# Patient Record
Sex: FEMALE | Race: WHITE | NOT HISPANIC OR LATINO
[De-identification: names, ages, dates, MRNs, and addresses within clinical notes are randomized per-mention and may not be internally consistent; named-entity substitution may affect disease eponyms.]

---

## 2019-03-29 ENCOUNTER — APPOINTMENT (OUTPATIENT)
Dept: PULMONOLOGY | Facility: CLINIC | Age: 64
End: 2019-03-29
Payer: COMMERCIAL

## 2019-03-29 VITALS
OXYGEN SATURATION: 98 % | WEIGHT: 121 LBS | DIASTOLIC BLOOD PRESSURE: 50 MMHG | HEART RATE: 84 BPM | HEIGHT: 59 IN | SYSTOLIC BLOOD PRESSURE: 92 MMHG | BODY MASS INDEX: 24.39 KG/M2

## 2019-03-29 DIAGNOSIS — Z86.19 PERSONAL HISTORY OF OTHER INFECTIOUS AND PARASITIC DISEASES: ICD-10-CM

## 2019-03-29 DIAGNOSIS — R93.89 ABNORMAL FINDINGS ON DIAGNOSTIC IMAGING OF OTHER SPECIFIED BODY STRUCTURES: ICD-10-CM

## 2019-03-29 DIAGNOSIS — J47.9 BRONCHIECTASIS, UNCOMPLICATED: ICD-10-CM

## 2019-03-29 PROCEDURE — 99204 OFFICE O/P NEW MOD 45 MIN: CPT | Mod: 25

## 2019-03-29 PROCEDURE — 94060 EVALUATION OF WHEEZING: CPT

## 2019-03-30 PROBLEM — R93.89 ABNORMAL CHEST X-RAY: Status: ACTIVE | Noted: 2019-03-30

## 2019-03-30 PROBLEM — Z86.19 HISTORY OF MYCOBACTERIUM AVIUM COMPLEX INFECTION: Status: ACTIVE | Noted: 2019-03-30

## 2019-03-30 RX ORDER — ALBUTEROL SULFATE 90 UG/1
108 (90 BASE) AEROSOL, METERED RESPIRATORY (INHALATION)
Refills: 0 | Status: ACTIVE | COMMUNITY

## 2019-03-30 RX ORDER — BUDESONIDE AND FORMOTEROL FUMARATE DIHYDRATE 160; 4.5 UG/1; UG/1
160-4.5 AEROSOL RESPIRATORY (INHALATION)
Refills: 0 | Status: ACTIVE | COMMUNITY

## 2019-03-30 NOTE — PROCEDURE
[FreeTextEntry1] : PFT 11/10/14: Mild obstructive airways disease was noted. Significant improvement noted after inhalation of bronchodilator.\par \par PFT 3/29/19: Spirometry did not demonstrate any obstructive airways disease. No significant change was noted after inhalation of bronchodilator.\par \par Chest CT performed on March 17, 2011 demonstrated increased interstitial markings which were most pronounced in the right mid lung field. Atelectasis in the right middle lobe was suspected.\par \par Chest CT performed on November 11, 2013 demonstrated mild patchy areas of mucous plugging which was most pronounced in the right middle lobe. Other areas of mild bronchiectasis and small areas of atelectasis was also noted.\par \par Chest CT performed on November 1, 2014 at [facility] showed areas of bronchiectasis which was most pronounced in the right middle lobe and in the lingula. Mild lymphadenopathy was present. No adenopathy was noted.\par \par Chest radiograph performed at urgent care in March 2019 demonstrated subsegmental atelectasis in the right middle lobe. No infiltrates or effusions.\par \par \par \par \par \par

## 2019-03-30 NOTE — HISTORY OF PRESENT ILLNESS
[FreeTextEntry1] : She was seen in urgent care several weeks ago for a cough. She was placed on antibiotics. The cough has improved but not completely resolved. Denies any chest pain or shortness of breath. No hemoptysis. No constitutional symptoms.

## 2019-03-30 NOTE — PHYSICAL EXAM
[Normal Appearance] : normal appearance [Neck Cervical Mass (___cm)] : no neck mass was observed [Heart Sounds] : normal S1 and S2 [Auscultation Breath Sounds / Voice Sounds] : lungs were clear to auscultation bilaterally [Bowel Sounds] : normal bowel sounds [Nail Clubbing] : no clubbing of the fingernails [Skin Turgor] : normal skin turgor [No Focal Deficits] : no focal deficits [Oriented To Time, Place, And Person] : oriented to person, place, and time [Impaired Insight] : insight and judgment were intact [Erythema] : no erythema of the pharynx

## 2019-03-30 NOTE — REVIEW OF SYSTEMS
[Cough] : cough [Fever] : no fever [Sputum] : not coughing up ~M sputum [Hemoptysis] : no hemoptysis [Dyspnea] : no dyspnea [Chest Tightness] : no chest tightness [Wheezing] : no wheezing [Edema] : ~T edema was not present [Nasal Discharge] : no nasal discharge [Heartburn] : no heartburn [Back Pain] : ~T no back pain [Rash] : no [unfilled] rash [Anemia] : no anemia [Depression] : no depression [Anxiety] : no anxiety [Diabetes] : no diabetes mellitus [Thyroid Problem] : no thyroid problem [DVT] : no DVT [Snoring] : no snoring

## 2019-03-30 NOTE — DISCUSSION/SUMMARY
[FreeTextEntry1] : She is a 63 year-old woman, a never smoker, with mild bronchiectasis and a history of Mycobacterium avium complex  infection diagnosed in March of 2011. According to her she received treatment for it but could not tell me who provided the medications for her. which was treated.\par \par She presented to Urgent Care with a cough in March of 2019 and was treated with antibiotics. The cough has improved. At this time there is no evidence of any active infection. She does have a history of mild reactive airways disease. She was advised to continue with Symbicort and albuterol as needed. She was concerned about tuberculosis exposure. A Quantiferon assay will be obtained.\par \par I would like to see her again in three months.

## 2019-04-15 LAB
M TB IFN-G BLD-IMP: POSITIVE
QUANTIFERON TB PLUS MITOGEN MINUS NIL: 8.99 IU/ML
QUANTIFERON TB PLUS NIL: 0.84 IU/ML
QUANTIFERON TB PLUS TB1 MINUS NIL: 0.03 IU/ML
QUANTIFERON TB PLUS TB2 MINUS NIL: 0.46 IU/ML

## 2019-07-05 ENCOUNTER — APPOINTMENT (OUTPATIENT)
Dept: PULMONOLOGY | Facility: CLINIC | Age: 64
End: 2019-07-05

## 2021-08-03 ENCOUNTER — NON-APPOINTMENT (OUTPATIENT)
Age: 66
End: 2021-08-03

## 2021-08-04 ENCOUNTER — NON-APPOINTMENT (OUTPATIENT)
Age: 66
End: 2021-08-04

## 2021-08-09 ENCOUNTER — APPOINTMENT (OUTPATIENT)
Dept: BREAST CENTER | Facility: CLINIC | Age: 66
End: 2021-08-09

## 2024-11-07 NOTE — REASON FOR VISIT
[Consultation] : a consultation visit [Abnormal Chest X-Ray] : abnormal Chest X-Ray [Cough] : cough Fay